# Patient Record
Sex: FEMALE | Race: WHITE | NOT HISPANIC OR LATINO | URBAN - METROPOLITAN AREA
[De-identification: names, ages, dates, MRNs, and addresses within clinical notes are randomized per-mention and may not be internally consistent; named-entity substitution may affect disease eponyms.]

---

## 2017-08-27 ENCOUNTER — EMERGENCY (EMERGENCY)
Facility: HOSPITAL | Age: 67
LOS: 1 days | Discharge: HOME | End: 2017-08-27
Attending: EMERGENCY MEDICINE | Admitting: EMERGENCY MEDICINE
Payer: MEDICARE

## 2017-08-27 VITALS
RESPIRATION RATE: 18 BRPM | TEMPERATURE: 98 F | DIASTOLIC BLOOD PRESSURE: 89 MMHG | OXYGEN SATURATION: 99 % | SYSTOLIC BLOOD PRESSURE: 144 MMHG | HEART RATE: 99 BPM

## 2017-08-27 DIAGNOSIS — M79.675 PAIN IN LEFT TOE(S): ICD-10-CM

## 2017-08-27 DIAGNOSIS — S92.512A DISPLACED FRACTURE OF PROXIMAL PHALANX OF LEFT LESSER TOE(S), INITIAL ENCOUNTER FOR CLOSED FRACTURE: ICD-10-CM

## 2017-08-27 PROCEDURE — 99284 EMERGENCY DEPT VISIT MOD MDM: CPT | Mod: 57,25

## 2017-08-27 PROCEDURE — 73660 X-RAY EXAM OF TOE(S): CPT | Mod: 26,LT

## 2017-08-27 PROCEDURE — 28510 TREATMENT OF TOE FRACTURE: CPT | Mod: 54

## 2017-08-27 RX ORDER — TRAMADOL HYDROCHLORIDE 50 MG/1
0.5 TABLET ORAL
Qty: 4.5 | Refills: 0 | OUTPATIENT
Start: 2017-08-27 | End: 2017-08-30

## 2017-08-27 NOTE — ED PROVIDER NOTE - PHYSICAL EXAMINATION
CON: ao x 3, HENMT: clear oropharynx, soft neck, HEAD: atraumatic, SKIN: no rash, MSK: L 5th toe mild swelling, tender over proximal phalanx, no tenderness to midfoot or 5th metatarsal, no crepitus, no swelling to midfoot, no mal tenderness CON: ao x 3, HENMT: clear oropharynx, soft neck, HEAD: atraumatic, SKIN: no rash, MSK: L 5th toe mild swelling, tender over proximal phalanx, no tenderness to midfoot or 5th metatarsal, no crepitus, no swelling to midfoot, no mal tenderness, distal pulses intact

## 2017-08-27 NOTE — ED PROVIDER NOTE - DIAGNOSTIC INTERPRETATION
ER Physician: Milton Gupta  INTERPRETATION:  5th proximal phalanx fx; no soft tissue swelling noted; no gas

## 2024-07-21 ENCOUNTER — EMERGENCY (EMERGENCY)
Facility: HOSPITAL | Age: 74
LOS: 1 days | Discharge: HOME | End: 2024-07-21
Admitting: EMERGENCY MEDICINE
Payer: MEDICARE

## 2024-07-21 VITALS
HEIGHT: 59 IN | OXYGEN SATURATION: 96 % | DIASTOLIC BLOOD PRESSURE: 82 MMHG | TEMPERATURE: 99 F | HEART RATE: 77 BPM | WEIGHT: 97 LBS | SYSTOLIC BLOOD PRESSURE: 143 MMHG | RESPIRATION RATE: 16 BRPM

## 2024-07-21 VITALS
OXYGEN SATURATION: 97 % | TEMPERATURE: 98 F | HEART RATE: 89 BPM | DIASTOLIC BLOOD PRESSURE: 91 MMHG | RESPIRATION RATE: 16 BRPM | SYSTOLIC BLOOD PRESSURE: 149 MMHG

## 2024-07-21 LAB
FLUAV AG NPH QL: SIGNIFICANT CHANGE UP
FLUBV AG NPH QL: SIGNIFICANT CHANGE UP
RSV RNA NPH QL NAA+NON-PROBE: SIGNIFICANT CHANGE UP
SARS-COV-2 RNA SPEC QL NAA+PROBE: DETECTED

## 2024-07-21 PROCEDURE — 99284 EMERGENCY DEPT VISIT MOD MDM: CPT

## 2024-07-21 PROCEDURE — 71045 X-RAY EXAM CHEST 1 VIEW: CPT | Mod: 26

## 2024-07-21 RX ORDER — NIRMATRELVIR AND RITONAVIR 300-100 MG
1 KIT ORAL
Qty: 1 | Refills: 0
Start: 2024-07-21 | End: 2024-07-25

## 2024-07-21 NOTE — ED PROVIDER NOTE - CLINICAL SUMMARY MEDICAL DECISION MAKING FREE TEXT BOX
Patient here with cough cold congestion for the last several days.  Patient positive for COVID-19.  X-ray negative  Will prescribe Paxlovid.  Patient educated on medication interactions with Paxlovid.  Patient fully understands instructions and will take Paxlovid

## 2024-07-21 NOTE — ED PROVIDER NOTE - PATIENT PORTAL LINK FT
You can access the FollowMyHealth Patient Portal offered by [facility] by registering at the following website: [email or web address]. By joining [facility]’s FollowMyHealth portal, you will also be able to view your health information using other applications (apps) compatible with our system.

## 2024-07-21 NOTE — ED PROVIDER NOTE - OBJECTIVE STATEMENT
73-year-old female coming in with cough cold congestion for the last day.  Patient appears well no acute distress.  Endorses history of high blood pressure, and hyperlipidemia.  Denies nausea, vomiting, fevers, chills

## 2024-07-21 NOTE — ED PROVIDER NOTE - NSFOLLOWUPINSTRUCTIONS_ED_ALL_ED_FT
THE COVID 19 TEST RESULTS  - results may take up to 2-3 days to become available   - if you have consented, you will receive your results electronically   -  you can check [employer] [facility] or call [phone] to discuss your results with our nursing staff    Return to ER for any worsening symptoms, such as persistent fever >100.4F, shortness of breath, coughing up bloody sputum, chest pain, lethargy, and fainting    Please remember to wash your hands frequently (>20 seconds each time), avoid touching your face, and cover your cough/sneeze.  Always wear a mask when you are outside of your home and practice social distancing.    Only take Tylenol for fever/pain control and avoid NSAIDs (ibuprofen/Advil/Aleve/naproxen) due to potential increased risk of exacerbating COVID-19 infection

## 2024-07-21 NOTE — ED ADULT NURSE NOTE - OBJECTIVE STATEMENT
Pt presents to ED A&Ox4 with c/o cough and congestion x3 days. Pt also c/o body aches and headache. Denies subjective fever/chills. SOB, difficulty breathing/swallowing, dizziness, LOC. Afebrile. Respirations equal and unlabored; pt able to speak in full sentences. Pt unsure of sick contacts recently. No other medical complaints at this time.

## 2024-07-24 DIAGNOSIS — U07.1 COVID-19: ICD-10-CM

## 2024-07-24 DIAGNOSIS — J06.9 ACUTE UPPER RESPIRATORY INFECTION, UNSPECIFIED: ICD-10-CM

## 2024-07-24 DIAGNOSIS — R05.9 COUGH, UNSPECIFIED: ICD-10-CM
